# Patient Record
(demographics unavailable — no encounter records)

---

## 2024-10-10 NOTE — ASSESSMENT
[FreeTextEntry1] : Mr. MARGARET SIMPSON, 73 year old male, former smoker (0.5 ppd x 30 yrs, quit 11/2022), w/ hx of essential tremor, and HLD, who presented with lung nodules, referred by Pulm Dr. Ramirez.  Now 1 yr s/p Lt VATS, R.A., wedge rxn of CLARKE and posterior segmentectomy, MLND on 9/20/23. 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  CT chest on 10/03/2024:  - Postoperative changes of the left upper lobe are again seen.  - Volume loss with patchy scarring/subsegmental atelectasis is visualized.  - Mild emphysematous changes are seen. - A mild degree of patchy mucus plugging with mild degree of clustered centrilobular nodularity is identified.  - Bilateral calcified granulomas are seen.   I have reviewed the patient's medical records and diagnostic images at time of this office consultation and have made the following recommendation: 1. CT reviewed with pt, stable scan. RTC in 6 mons with CT Chest w/o contrast   I, Dr. STARKEY, HARESH TORRES, personally performed the evaluation and management (E/M) services for this established patient who presents today with (a) new problem(s)/exacerbation of (an) existing condition(s).  That E/M includes conducting the examination, assessing all new/exacerbated conditions, and establishing a new plan of care.  Today, my ACP, Eleni Kauffman, ZACHERY-BC was here to observe my evaluation and management services for this new problem/exacerbated condition to be followed going forward.

## 2024-10-10 NOTE — ASSESSMENT
[FreeTextEntry1] : Mr. MARGARET SIMPSON, 73 year old male, former smoker (0.5 ppd x 30 yrs, quit 11/2022), w/ hx of essential tremor, and HLD, who presented with lung nodules, referred by Pulm Dr. Ramirez.  Now 1 yr s/p Lt VATS, R.A., wedge rxn of CLARKE and posterior segmentectomy, MLND on 9/20/23. 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  CT chest on 10/03/2024:  - Postoperative changes of the left upper lobe are again seen.  - Volume loss with patchy scarring/subsegmental atelectasis is visualized.  - Mild emphysematous changes are seen. - A mild degree of patchy mucus plugging with mild degree of clustered centrilobular nodularity is identified.  - Bilateral calcified granulomas are seen.   I have reviewed the patient's medical records and diagnostic images at time of this office consultation and have made the following recommendation: 1. CT reviewed with pt, stable scan. RTC in 6 mons with CT Chest w/o contrast   I, Dr. STARKEY, HARESH TORRES, personally performed the evaluation and management (E/M) services for this established patient who presents today with (a) new problem(s)/exacerbation of (an) existing condition(s).  That E/M includes conducting the examination, assessing all new/exacerbated conditions, and establishing a new plan of care.  Today, my ACP, Eleni Kauffman, ZACHERY-BC was here to observe my evaluation and management services for this new problem/exacerbated condition to be followed going forward.

## 2024-10-10 NOTE — ASSESSMENT
[FreeTextEntry1] : Mr. MARGARET SIMPSON, 73 year old male, former smoker (0.5 ppd x 30 yrs, quit 11/2022), w/ hx of essential tremor, and HLD, who presented with lung nodules, referred by Pulm Dr. Ramirez.  Now 1 yr s/p Lt VATS, R.A., wedge rxn of CLARKE and posterior segmentectomy, MLND on 9/20/23. 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  CT chest on 10/03/2024:  - Postoperative changes of the left upper lobe are again seen.  - Volume loss with patchy scarring/subsegmental atelectasis is visualized.  - Mild emphysematous changes are seen. - A mild degree of patchy mucus plugging with mild degree of clustered centrilobular nodularity is identified.  - Bilateral calcified granulomas are seen.   I have reviewed the patient's medical records and diagnostic images at time of this office consultation and have made the following recommendation: 1. CT reviewed with pt, stable scan. RTC in 6 mons with CT Chest w/o contrast   I, Dr. STARKEY, HARESH TORRES, personally performed the evaluation and management (E/M) services for this established patient who presents today with (a) new problem(s)/exacerbation of (an) existing condition(s).  That E/M includes conducting the examination, assessing all new/exacerbated conditions, and establishing a new plan of care.  Today, my ACP, Eleni Kauffman, ZACHERY-BC was here to observe my evaluation and management services for this new problem/exacerbated condition to be followed going forward.

## 2024-10-10 NOTE — HISTORY OF PRESENT ILLNESS
[FreeTextEntry1] : Mr. MARGARET SIMPSON, 74 year old male, former smoker (0.5 ppd x 30 yrs, quit 11/2022), w/ hx of essential tremor, and HLD, who presented with lung nodules, referred by Pulm Dr. Ramirez.  PFTs on 6/20/23: FVC 86%, FEV1 92%, DLCO 73%.  CT Chest on 7/3/23 at [facility]: - No significant adenopathy or masses are present. There are calcified right hilar lymph nodes - a pacemaker. - a spiculated 1.9 cm partially cavitary subsolid nodule in the CLARKE that has solid nodules which are new when compared to June 12, 2021 for example 5 mm CLARKE (7: 81). The thin spicules extend to the lateral pleural surface. This is suspicious for a low-grade malignancy - Calcified granulomas are noted in the superior segment LLL. There is a stable calcified granuloma left lung base. - Small airways disease with mucoid impaction seen superior segment LLL (7: 86). - No evidence of pleural effusion or pleural-based mass. - stable 1.5 cm low-density nodule right lobe of thyroid.  PET/CT on 7/25/23 at [facility]: - battery pack projects over the Lt upper chest, with a wire extending from it, up the left neck, into the left lateral ventricle posterior horn region (incompletely in the field of view). - a 14 mm Rt lobe thyroid nodule on image #41 without malignant activity. - no other evidence of skull base abnormalities, nor significant lymphadenopathy or mass lesions in the neck. - stable spiculated 19 mm SUV=0.9 partially cavitary sub solid nodule in the CLARKE (image 58), suspicious for low grade malignancy. - SUVmax= 2.3 activity corresponding to a 14 mm cluster of tiny nodules in the superior LLL, stable, likely inflammatory, image #58. - SUVmax= 2.4 Lt suprahilar activity on image #54 without CT abnormality, likely inflammatory. - a few tiny benign pulmonary granulomas are again seen, mostly calcified, stable from 6/12/21. - no evidence of pleural effusion, pneumothorax, pericardial effusion or significant lymphadenopathy in the chest.  Now 1 yr s/p Lt VATS, R.A., wedge rxn of CLARKE and posterior segmentectomy, MLND on 9/20/23. 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  CT Chest on 4/3/24 at [facility]: - multiple Thyroid nodules measuring up to 1.9 cm in size in the right thyroid lobe (series 5, image 30).  - post-op changes - mild non-specific thickening along the suture line measuring up to 5 mm in thickness that may be post-operative in etiology (series 5, image 109).  - Multiple stable bilateral pulmonary nodules measuring up to 2 mm in size (some of which are clustered and centrilobular in their distribution and may be related to chronic bronchiolitis) are unchanged since June 12, 2021 and are most likely benign; as two selected examples of many, there is an unchanged 2 mm right upper lobe pulmonary nodule (series 5, image 63) and an unchanged 2 mm left lower lobe pulmonary nodule (series 5, image 289).  - There are multiple sub-centimeter calcified granulomas within the lungs. There is no pleural effusion or pneumothorax on either side. - no focal site of pulmonary airspace consolidation. There is persistent minimal pulmonaryemphysema with an upper lung distribution.  On 05/01/2024, 2. Multiple thyroid nodules up to 1.9 cm, referred to Dr. Figueroa.   CT chest on 10/03/2024:  - Postoperative changes of the left upper lobe are again seen.  - Volume loss with patchy scarring/subsegmental atelectasis is visualized.  - Mild emphysematous changes are seen. - A mild degree of patchy mucus plugging with mild degree of clustered centrilobular nodularity is identified.  - Bilateral calcified granulomas are seen.   PHQ2 completed on 10/10/24.   Patient is here today for a follow up. Pt admits to mild cough, denies SOB or CP.

## 2024-10-10 NOTE — HISTORY OF PRESENT ILLNESS
[FreeTextEntry1] : Mr. MARGARET SIMPSON, 74 year old male, former smoker (0.5 ppd x 30 yrs, quit 11/2022), w/ hx of essential tremor, and HLD, who presented with lung nodules, referred by Pulm Dr. Ramirez.  PFTs on 6/20/23: FVC 86%, FEV1 92%, DLCO 73%.  CT Chest on 7/3/23 at [facility]: - No significant adenopathy or masses are present. There are calcified right hilar lymph nodes - a pacemaker. - a spiculated 1.9 cm partially cavitary subsolid nodule in the CLARKE that has solid nodules which are new when compared to June 12, 2021 for example 5 mm CLARKE (7: 81). The thin spicules extend to the lateral pleural surface. This is suspicious for a low-grade malignancy - Calcified granulomas are noted in the superior segment LLL. There is a stable calcified granuloma left lung base. - Small airways disease with mucoid impaction seen superior segment LLL (7: 86). - No evidence of pleural effusion or pleural-based mass. - stable 1.5 cm low-density nodule right lobe of thyroid.  PET/CT on 7/25/23 at [facility]: - battery pack projects over the Lt upper chest, with a wire extending from it, up the left neck, into the left lateral ventricle posterior horn region (incompletely in the field of view). - a 14 mm Rt lobe thyroid nodule on image #41 without malignant activity. - no other evidence of skull base abnormalities, nor significant lymphadenopathy or mass lesions in the neck. - stable spiculated 19 mm SUV=0.9 partially cavitary sub solid nodule in the CLARKE (image 58), suspicious for low grade malignancy. - SUVmax= 2.3 activity corresponding to a 14 mm cluster of tiny nodules in the superior LLL, stable, likely inflammatory, image #58. - SUVmax= 2.4 Lt suprahilar activity on image #54 without CT abnormality, likely inflammatory. - a few tiny benign pulmonary granulomas are again seen, mostly calcified, stable from 6/12/21. - no evidence of pleural effusion, pneumothorax, pericardial effusion or significant lymphadenopathy in the chest.  Now 1 yr s/p Lt VATS, R.A., wedge rxn of CLARKE and posterior segmentectomy, MLND on 9/20/23. 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  CT Chest on 4/3/24 at [facility]: - multiple Thyroid nodules measuring up to 1.9 cm in size in the right thyroid lobe (series 5, image 30).  - post-op changes - mild non-specific thickening along the suture line measuring up to 5 mm in thickness that may be post-operative in etiology (series 5, image 109).  - Multiple stable bilateral pulmonary nodules measuring up to 2 mm in size (some of which are clustered and centrilobular in their distribution and may be related to chronic bronchiolitis) are unchanged since June 12, 2021 and are most likely benign; as two selected examples of many, there is an unchanged 2 mm right upper lobe pulmonary nodule (series 5, image 63) and an unchanged 2 mm left lower lobe pulmonary nodule (series 5, image 289).  - There are multiple sub-centimeter calcified granulomas within the lungs. There is no pleural effusion or pneumothorax on either side. - no focal site of pulmonary airspace consolidation. There is persistent minimal pulmonaryemphysema with an upper lung distribution.  On 05/01/2024, 2. Multiple thyroid nodules up to 1.9 cm, referred to Dr. Figueroa.   CT chest on 10/03/2024:  - Postoperative changes of the left upper lobe are again seen.  - Volume loss with patchy scarring/subsegmental atelectasis is visualized.  - Mild emphysematous changes are seen. - A mild degree of patchy mucus plugging with mild degree of clustered centrilobular nodularity is identified.  - Bilateral calcified granulomas are seen.   PHQ2 completed on 10/10/24.   Patient is here today for a follow up. Pt admits to mild cough, denies SOB or CP.

## 2024-10-10 NOTE — CONSULT LETTER
[Consult Letter:] : I had the pleasure of evaluating your patient, [unfilled]. [( Thank you for referring [unfilled] for consultation for _____ )] : Thank you for referring [unfilled] for consultation for [unfilled] [Please see my note below.] : Please see my note below. [Consult Closing:] : Thank you very much for allowing me to participate in the care of this patient.  If you have any questions, please do not hesitate to contact me. [Sincerely,] : Sincerely, [FreeTextEntry2] : Dr. Jean-Paul Ramirez (Pulm/Referring) [FreeTextEntry3] : Jorge Banegas MD, MPH  System Director of Thoracic Surgery  Director of Comprehensive Lung and Foregut [place]  Professor Cardiovascular & Thoracic Surgery   [facility] at [facility] [phone] [place] [address] [place] [address] Tel: [phone] Fax: [phone]

## 2024-10-10 NOTE — CONSULT LETTER
[Consult Letter:] : I had the pleasure of evaluating your patient, [unfilled]. [( Thank you for referring [unfilled] for consultation for _____ )] : Thank you for referring [unfilled] for consultation for [unfilled] [Please see my note below.] : Please see my note below. [Sincerely,] : Sincerely, [Consult Closing:] : Thank you very much for allowing me to participate in the care of this patient.  If you have any questions, please do not hesitate to contact me. [FreeTextEntry2] : Dr. Jean-Paul Ramirez (Pulm/Referring) [FreeTextEntry3] : Jorge Banegas MD, MPH  System Director of Thoracic Surgery  Director of Comprehensive Lung and Foregut [place]  Professor Cardiovascular & Thoracic Surgery   [facility] at [facility] [phone] [place] [address] [place] [address] Tel: [phone] Fax: [phone]

## 2024-10-10 NOTE — CONSULT LETTER
[Consult Letter:] : I had the pleasure of evaluating your patient, [unfilled]. [( Thank you for referring [unfilled] for consultation for _____ )] : Thank you for referring [unfilled] for consultation for [unfilled] [Please see my note below.] : Please see my note below. [Consult Closing:] : Thank you very much for allowing me to participate in the care of this patient.  If you have any questions, please do not hesitate to contact me. [Sincerely,] : Sincerely, [FreeTextEntry2] : Dr. Jean-Paul Ramirez (Pulm/Referring) [FreeTextEntry3] : Jorge Banegas MD, MPH  System Director of Thoracic Surgery  Director of Comprehensive Lung and Foregut [place]  Professor Cardiovascular & Thoracic Surgery   [facility] at [facility] [phone] [place] [address] [place] [address] Tel: [phone] Fax: [phone]

## 2024-10-10 NOTE — HISTORY OF PRESENT ILLNESS
[FreeTextEntry1] : Mr. MARGARET SIMPSON, 74 year old male, former smoker (0.5 ppd x 30 yrs, quit 11/2022), w/ hx of essential tremor, and HLD, who presented with lung nodules, referred by Pulm Dr. Ramirez.  PFTs on 6/20/23: FVC 86%, FEV1 92%, DLCO 73%.  CT Chest on 7/3/23 at [facility]: - No significant adenopathy or masses are present. There are calcified right hilar lymph nodes - a pacemaker. - a spiculated 1.9 cm partially cavitary subsolid nodule in the CLARKE that has solid nodules which are new when compared to June 12, 2021 for example 5 mm CLARKE (7: 81). The thin spicules extend to the lateral pleural surface. This is suspicious for a low-grade malignancy - Calcified granulomas are noted in the superior segment LLL. There is a stable calcified granuloma left lung base. - Small airways disease with mucoid impaction seen superior segment LLL (7: 86). - No evidence of pleural effusion or pleural-based mass. - stable 1.5 cm low-density nodule right lobe of thyroid.  PET/CT on 7/25/23 at [facility]: - battery pack projects over the Lt upper chest, with a wire extending from it, up the left neck, into the left lateral ventricle posterior horn region (incompletely in the field of view). - a 14 mm Rt lobe thyroid nodule on image #41 without malignant activity. - no other evidence of skull base abnormalities, nor significant lymphadenopathy or mass lesions in the neck. - stable spiculated 19 mm SUV=0.9 partially cavitary sub solid nodule in the CLARKE (image 58), suspicious for low grade malignancy. - SUVmax= 2.3 activity corresponding to a 14 mm cluster of tiny nodules in the superior LLL, stable, likely inflammatory, image #58. - SUVmax= 2.4 Lt suprahilar activity on image #54 without CT abnormality, likely inflammatory. - a few tiny benign pulmonary granulomas are again seen, mostly calcified, stable from 6/12/21. - no evidence of pleural effusion, pneumothorax, pericardial effusion or significant lymphadenopathy in the chest.  Now 1 yr s/p Lt VATS, R.A., wedge rxn of CLARKE and posterior segmentectomy, MLND on 9/20/23. 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  CT Chest on 4/3/24 at [facility]: - multiple Thyroid nodules measuring up to 1.9 cm in size in the right thyroid lobe (series 5, image 30).  - post-op changes - mild non-specific thickening along the suture line measuring up to 5 mm in thickness that may be post-operative in etiology (series 5, image 109).  - Multiple stable bilateral pulmonary nodules measuring up to 2 mm in size (some of which are clustered and centrilobular in their distribution and may be related to chronic bronchiolitis) are unchanged since June 12, 2021 and are most likely benign; as two selected examples of many, there is an unchanged 2 mm right upper lobe pulmonary nodule (series 5, image 63) and an unchanged 2 mm left lower lobe pulmonary nodule (series 5, image 289).  - There are multiple sub-centimeter calcified granulomas within the lungs. There is no pleural effusion or pneumothorax on either side. - no focal site of pulmonary airspace consolidation. There is persistent minimal pulmonaryemphysema with an upper lung distribution.  On 05/01/2024, 2. Multiple thyroid nodules up to 1.9 cm, referred to Dr. Figueroa.   CT chest on 10/03/2024:  - Postoperative changes of the left upper lobe are again seen.  - Volume loss with patchy scarring/subsegmental atelectasis is visualized.  - Mild emphysematous changes are seen. - A mild degree of patchy mucus plugging with mild degree of clustered centrilobular nodularity is identified.  - Bilateral calcified granulomas are seen.   PHQ2 completed on 10/10/24.   Patient is here today for a follow up. Pt admits to mild cough, denies SOB or CP.

## 2025-04-18 NOTE — HISTORY OF PRESENT ILLNESS
[FreeTextEntry1] : Mr. MARGARET SIMPSON, 74 year old male, former smoker (0.5 ppd x 30 yrs, quit 11/2022), w/ hx of essential tremor, and HLD, who presented with lung nodules, referred by Pulm Dr. Ramirez.  PFTs on 6/20/23: FVC 86%, FEV1 92%, DLCO 73%.  CT Chest on 7/3/23 at [facility]: - No significant adenopathy or masses are present. There are calcified right hilar lymph nodes - a pacemaker. - a spiculated 1.9 cm partially cavitary subsolid nodule in the CLARKE that has solid nodules which are new when compared to June 12, 2021 for example 5 mm CLARKE (7: 81). The thin spicules extend to the lateral pleural surface. This is suspicious for a low-grade malignancy - Calcified granulomas are noted in the superior segment LLL. There is a stable calcified granuloma left lung base. - Small airways disease with mucoid impaction seen superior segment LLL (7: 86). - No evidence of pleural effusion or pleural-based mass. - stable 1.5 cm low-density nodule right lobe of thyroid.  PET/CT on 7/25/23 at [facility]: - battery pack projects over the Lt upper chest, with a wire extending from it, up the left neck, into the left lateral ventricle posterior horn region (incompletely in the field of view). - a 14 mm Rt lobe thyroid nodule on image #41 without malignant activity. - no other evidence of skull base abnormalities, nor significant lymphadenopathy or mass lesions in the neck. - stable spiculated 19 mm SUV=0.9 partially cavitary sub solid nodule in the CLARKE (image 58), suspicious for low grade malignancy. - SUVmax= 2.3 activity corresponding to a 14 mm cluster of tiny nodules in the superior LLL, stable, likely inflammatory, image #58. - SUVmax= 2.4 Lt suprahilar activity on image #54 without CT abnormality, likely inflammatory. - a few tiny benign pulmonary granulomas are again seen, mostly calcified, stable from 6/12/21. - no evidence of pleural effusion, pneumothorax, pericardial effusion or significant lymphadenopathy in the chest.  Now 1.5 yr s/p Lt VATS, R.A., wedge rxn of CLARKE and posterior segmentectomy, MLND on 9/20/23. 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  CT Chest on 4/3/24 at [facility]: - multiple Thyroid nodules measuring up to 1.9 cm in size in the right thyroid lobe (series 5, image 30).  - post-op changes - mild non-specific thickening along the suture line measuring up to 5 mm in thickness that may be post-operative in etiology (series 5, image 109).  - Multiple stable bilateral pulmonary nodules measuring up to 2 mm in size (some of which are clustered and centrilobular in their distribution and may be related to chronic bronchiolitis) are unchanged since June 12, 2021 and are most likely benign; as two selected examples of many, there is an unchanged 2 mm right upper lobe pulmonary nodule (series 5, image 63) and an unchanged 2 mm left lower lobe pulmonary nodule (series 5, image 289).  - There are multiple sub-centimeter calcified granulomas within the lungs. There is no pleural effusion or pneumothorax on either side. - no focal site of pulmonary airspace consolidation. There is persistent minimal pulmonaryemphysema with an upper lung distribution.  On 05/01/2024, 2. Multiple thyroid nodules up to 1.9 cm, referred to Dr. Figueroa.   CT chest on 10/03/2024:  - Postoperative changes of the left upper lobe are again seen.  - Volume loss with patchy scarring/subsegmental atelectasis is visualized.  - Mild emphysematous changes are seen. - A mild degree of patchy mucus plugging with mild degree of clustered centrilobular nodularity is identified.  - Bilateral calcified granulomas are seen.   CT chest on 4/17/25 at [facility]:  - post-op changes  - scattered small calcified pulm granulomata - JONATHAN  Patient is here today for a follow up.

## 2025-04-18 NOTE — ASSESSMENT
[FreeTextEntry1] : Mr. MARGARET SIMPSON, 74 year old male, former smoker (0.5 ppd x 30 yrs, quit 11/2022), w/ hx of essential tremor, and HLD, who presented with lung nodules, referred by Pulm Dr. Ramirez.  Now 1.5 yr s/p Lt VATS, R.A., wedge rxn of CLARKE and posterior segmentectomy, MLND on 9/20/23. 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  On 05/01/2024, 2. Multiple thyroid nodules up to 1.9 cm, referred to Dr. Figueroa.   CT chest on 4/17/25 at [facility]:  - post-op changes  - scattered small calcified pulm granulomata - JONATHAN  I have reviewed the patient's medical records and diagnostic images at the time of this office consultation and have made the following recommendation: 1.

## 2025-04-18 NOTE — CONSULT LETTER
[Consult Letter:] : I had the pleasure of evaluating your patient, [unfilled]. [( Thank you for referring [unfilled] for consultation for _____ )] : Thank you for referring [unfilled] for consultation for [unfilled] [Please see my note below.] : Please see my note below. [Consult Closing:] : Thank you very much for allowing me to participate in the care of this patient.  If you have any questions, please do not hesitate to contact me. [Sincerely,] : Sincerely, [FreeTextEntry2] : Dr. Jean-Paul Ramirez (Pulm/Referring) [FreeTextEntry3] : Jorge Banegas MD, MPH  System Director of Thoracic Surgery  Director of Comprehensive Lung and Foregut [place]  Professor Cardiovascular & Thoracic Surgery   [facility] [employer] [facility] at [facility] [phone] [place] [address] [place] [address] Tel: [phone] Fax: [phone]

## 2025-04-18 NOTE — CONSULT LETTER
[Consult Letter:] : I had the pleasure of evaluating your patient, [unfilled]. [( Thank you for referring [unfilled] for consultation for _____ )] : Thank you for referring [unfilled] for consultation for [unfilled] [Please see my note below.] : Please see my note below. [Consult Closing:] : Thank you very much for allowing me to participate in the care of this patient.  If you have any questions, please do not hesitate to contact me. [Sincerely,] : Sincerely, [FreeTextEntry2] : Dr. Jean-Paul Ramirez (Pulm/Referring) [FreeTextEntry3] : Jorge Banegas MD, MPH  System Director of Thoracic Surgery  Director of Comprehensive Lung and Foregut [place]  Professor Cardiovascular & Thoracic Surgery   [facility] at [facility] [phone] [place] [address] [place] [address] Tel: [phone] Fax: [phone]

## 2025-04-18 NOTE — HISTORY OF PRESENT ILLNESS
[FreeTextEntry1] : Mr. MARGARET SIMPSON, 74 year old male, former smoker (0.5 ppd x 30 yrs, quit 11/2022), w/ hx of essential tremor, and HLD, who presented with lung nodules, referred by Pulm Dr. Ramirez.  PFTs on 6/20/23: FVC 86%, FEV1 92%, DLCO 73%.  CT Chest on 7/3/23 at [facility]: - No significant adenopathy or masses are present. There are calcified right hilar lymph nodes - a pacemaker. - a spiculated 1.9 cm partially cavitary subsolid nodule in the CLARKE that has solid nodules which are new when compared to June 12, 2021 for example 5 mm CLARKE (7: 81). The thin spicules extend to the lateral pleural surface. This is suspicious for a low-grade malignancy - Calcified granulomas are noted in the superior segment LLL. There is a stable calcified granuloma left lung base. - Small airways disease with mucoid impaction seen superior segment LLL (7: 86). - No evidence of pleural effusion or pleural-based mass. - stable 1.5 cm low-density nodule right lobe of thyroid.  PET/CT on 7/25/23 at [facility]: - battery pack projects over the Lt upper chest, with a wire extending from it, up the left neck, into the left lateral ventricle posterior horn region (incompletely in the field of view). - a 14 mm Rt lobe thyroid nodule on image #41 without malignant activity. - no other evidence of skull base abnormalities, nor significant lymphadenopathy or mass lesions in the neck. - stable spiculated 19 mm SUV=0.9 partially cavitary sub solid nodule in the CLARKE (image 58), suspicious for low grade malignancy. - SUVmax= 2.3 activity corresponding to a 14 mm cluster of tiny nodules in the superior LLL, stable, likely inflammatory, image #58. - SUVmax= 2.4 Lt suprahilar activity on image #54 without CT abnormality, likely inflammatory. - a few tiny benign pulmonary granulomas are again seen, mostly calcified, stable from 6/12/21. - no evidence of pleural effusion, pneumothorax, pericardial effusion or significant lymphadenopathy in the chest.  Now 1.5 yr s/p Lt VATS, R.A., wedge rxn of CLARKE and posterior segmentectomy, [facility] on 9/20/23. 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  CT Chest on 4/3/24 at [facility]: - multiple Thyroid nodules measuring up to 1.9 cm in size in the right thyroid lobe (series 5, image 30).  - post-op changes - mild non-specific thickening along the suture line measuring up to 5 mm in thickness that may be post-operative in etiology (series 5, image 109).  - Multiple stable bilateral pulmonary nodules measuring up to 2 mm in size (some of which are clustered and centrilobular in their distribution and may be related to chronic bronchiolitis) are unchanged since June 12, 2021 and are most likely benign; as two selected examples of many, there is an unchanged 2 mm right upper lobe pulmonary nodule (series 5, image 63) and an unchanged 2 mm left lower lobe pulmonary nodule (series 5, image 289).  - There are multiple sub-centimeter calcified granulomas within the lungs. There is no pleural effusion or pneumothorax on either side. - no focal site of pulmonary airspace consolidation. There is persistent minimal pulmonaryemphysema with an upper lung distribution.  On 05/01/2024, 2. Multiple thyroid nodules up to 1.9 cm, referred to Dr. Figueroa.   CT chest on 10/03/2024:  - Postoperative changes of the left upper lobe are again seen.  - Volume loss with patchy scarring/subsegmental atelectasis is visualized.  - Mild emphysematous changes are seen. - A mild degree of patchy mucus plugging with mild degree of clustered centrilobular nodularity is identified.  - Bilateral calcified granulomas are seen.   CT chest on 4/17/25 at [facility]:  - post-op changes  - scattered small calcified pulm granulomata - JONATHAN  Patient is here today for a follow up.

## 2025-04-18 NOTE — ASSESSMENT
[FreeTextEntry1] : Mr. MARGARET SIMPSON, 74 year old male, former smoker (0.5 ppd x 30 yrs, quit 11/2022), w/ hx of essential tremor, and HLD, who presented with lung nodules, referred by Pulm Dr. Ramirez.  Now 1.5 yr s/p Lt VATS, R.A., wedge rxn of CLARKE and posterior segmentectomy, [facility] on 9/20/23. 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  On 05/01/2024, 2. Multiple thyroid nodules up to 1.9 cm, referred to Dr. Figueroa.   [place] chest [place] 4[place]17/25 [place] [facility] [place]  - post-op changes  - scattered small calcified pulm granulomata - JONATHAN  I have reviewed the patient's medical records and diagnostic images at the time of this office consultation and have made the following recommendation: 1.

## 2025-04-23 NOTE — ASSESSMENT
[FreeTextEntry1] : Mr. MARGARET SIMPSON, 74 year old male, former smoker (0.5 ppd x 30 yrs, quit 11/2022), w/ hx of essential tremor, and HLD, who presented with lung nodules, referred by Pulm Dr. Ramirez.  Now 1.5 yr s/p Lt VATS, R.A., wedge rxn of CLARKE and posterior segmentectomy, MLND on 9/20/23. 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  On 05/01/2024, 2. Multiple thyroid nodules up to 1.9 cm, referred to Dr. Figueroa.   CT chest [facility] 4/17/25 [facility]:  - post-op changes  - scattered small calcified pulm granulomata - JONATHAN  I have reviewed the patient's medical records and diagnostic images at the time of this office consultation and have made the following recommendation: 1.

## 2025-04-23 NOTE — HISTORY OF PRESENT ILLNESS
[FreeTextEntry1] : Mr. MARGARET SIMPSON, 74 year old male, former smoker (0.5 ppd x 30 yrs, quit 11/2022), w/ hx of essential tremor, and HLD, who presented with lung nodules, referred by Pulm Dr. Ramirez.  PFTs on 6/20/23: FVC 86%, FEV1 92%, DLCO 73%.  CT Chest on 7/3/23 at [facility]: - No significant adenopathy or masses are present. There are calcified right hilar lymph nodes - a pacemaker. - a spiculated 1.9 cm partially cavitary subsolid nodule in the CLARKE that has solid nodules which are new when compared to June 12, 2021 for example 5 mm CLARKE (7: 81). The thin spicules extend to the lateral pleural surface. This is suspicious for a low-grade malignancy - Calcified granulomas are noted in the superior segment LLL. There is a stable calcified granuloma left lung base. - Small airways disease with mucoid impaction seen superior segment LLL (7: 86). - No evidence of pleural effusion or pleural-based mass. - stable 1.5 cm low-density nodule right lobe of thyroid.  PET/CT on 7/25/23 at [facility]: - battery pack projects over the Lt upper chest, with a wire extending from it, up the left neck, into the left lateral ventricle posterior horn region (incompletely in the field of view). - a 14 mm Rt lobe thyroid nodule on image #41 without malignant activity. - no other evidence of skull base abnormalities, nor significant lymphadenopathy or mass lesions in the neck. - stable spiculated 19 mm SUV=0.9 partially cavitary sub solid nodule in the CLARKE (image 58), suspicious for low grade malignancy. - SUVmax= 2.3 activity corresponding to a 14 mm cluster of tiny nodules in the superior LLL, stable, likely inflammatory, image #58. - SUVmax= 2.4 Lt suprahilar activity on image #54 without CT abnormality, likely inflammatory. - a few tiny benign pulmonary granulomas are again seen, mostly calcified, stable from 6/12/21. - no evidence of pleural effusion, pneumothorax, pericardial effusion or significant lymphadenopathy in the chest.  Now 1.5 yr s/p Lt VATS, R.A., wedge rxn of CLARKE and posterior segmentectomy, MLND on 9/20/23. 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  CT Chest on 4/3/24 at [facility]: - multiple Thyroid nodules measuring up to 1.9 cm in size in the right thyroid lobe (series 5, image 30).  - post-op changes - mild non-specific thickening along the suture line measuring up to 5 mm in thickness that may be post-operative in etiology (series 5, image 109).  - Multiple stable bilateral pulmonary nodules measuring up to 2 mm in size (some of which are clustered and centrilobular in their distribution and may be related to chronic bronchiolitis) are unchanged since June 12, 2021 and are most likely benign; as two selected examples of many, there is an unchanged 2 mm right upper lobe pulmonary nodule (series 5, image 63) and an unchanged 2 mm left lower lobe pulmonary nodule (series 5, image 289).  - There are multiple sub-centimeter calcified granulomas within the lungs. There is no pleural effusion or pneumothorax on either side. - no focal site of pulmonary airspace consolidation. There is persistent minimal pulmonaryemphysema with an upper lung distribution.  On 05/01/2024, 2. Multiple thyroid nodules up to 1.9 cm, referred to Dr. Figeuroa.   CT chest on 10/03/2024:  - Postoperative changes of the left upper lobe are again seen.  - Volume loss with patchy scarring/subsegmental atelectasis is visualized.  - Mild emphysematous changes are seen. - A mild degree of patchy mucus plugging with mild degree of clustered centrilobular nodularity is identified.  - Bilateral calcified granulomas are seen.   CT chest on 4/17/25 at [facility]:  - post-op changes  - scattered small calcified pulm granulomata - JONATHAN  Patient is here today for a follow up.

## 2025-04-23 NOTE — CONSULT LETTER
[FreeTextEntry2] : Dr. Jean-Paul Ramirez (Pulm/Referring) [FreeTextEntry3] : Jorge Banegas MD, MPH  System Director of Thoracic Surgery  Director of Comprehensive Lung and Foregut [place]  Professor Cardiovascular & Thoracic Surgery   [facility] [phone] [place] [address] [place] [address] Tel: [phone] Fax: [phone]